# Patient Record
Sex: MALE | Race: WHITE | NOT HISPANIC OR LATINO | Employment: FULL TIME | URBAN - METROPOLITAN AREA
[De-identification: names, ages, dates, MRNs, and addresses within clinical notes are randomized per-mention and may not be internally consistent; named-entity substitution may affect disease eponyms.]

---

## 2018-01-09 ENCOUNTER — HOSPITAL ENCOUNTER (OUTPATIENT)
Dept: RADIOLOGY | Facility: OTHER | Age: 63
Discharge: HOME | End: 2018-01-09
Attending: FAMILY MEDICINE
Payer: COMMERCIAL

## 2018-01-09 ENCOUNTER — OFFICE VISIT (OUTPATIENT)
Dept: INTERNAL MEDICINE | Facility: CLINIC | Age: 63
End: 2018-01-09
Attending: FAMILY MEDICINE
Payer: COMMERCIAL

## 2018-01-09 VITALS
BODY MASS INDEX: 26.33 KG/M2 | WEIGHT: 188.06 LBS | TEMPERATURE: 98 F | DIASTOLIC BLOOD PRESSURE: 74 MMHG | OXYGEN SATURATION: 98 % | HEIGHT: 71 IN | SYSTOLIC BLOOD PRESSURE: 116 MMHG | HEART RATE: 64 BPM

## 2018-01-09 DIAGNOSIS — R05.9 COUGH: ICD-10-CM

## 2018-01-09 DIAGNOSIS — R50.9 FEVER, UNSPECIFIED FEVER CAUSE: ICD-10-CM

## 2018-01-09 DIAGNOSIS — R05.9 COUGH: Primary | ICD-10-CM

## 2018-01-09 PROCEDURE — 71046 X-RAY EXAM CHEST 2 VIEWS: CPT | Mod: 26,,, | Performed by: RADIOLOGY

## 2018-01-09 PROCEDURE — 99214 OFFICE O/P EST MOD 30 MIN: CPT | Mod: S$GLB,,, | Performed by: FAMILY MEDICINE

## 2018-01-09 PROCEDURE — 71046 X-RAY EXAM CHEST 2 VIEWS: CPT | Mod: TC,FY

## 2018-01-09 PROCEDURE — 99999 PR PBB SHADOW E&M-EST. PATIENT-LVL III: CPT | Mod: PBBFAC,,, | Performed by: FAMILY MEDICINE

## 2018-01-09 RX ORDER — ELVITEGRAVIR, COBICISTAT, EMTRICITABINE, AND TENOFOVIR ALAFENAMIDE 150; 150; 200; 10 MG/1; MG/1; MG/1; MG/1
TABLET ORAL
Refills: 1 | COMMUNITY
Start: 2017-12-20

## 2018-01-09 RX ORDER — ALBUTEROL SULFATE 90 UG/1
2 AEROSOL, METERED RESPIRATORY (INHALATION) EVERY 6 HOURS PRN
Qty: 18 G | Refills: 0 | Status: SHIPPED | OUTPATIENT
Start: 2018-01-09 | End: 2019-01-09

## 2018-01-09 RX ORDER — LOSARTAN POTASSIUM 50 MG/1
TABLET ORAL
Refills: 1 | COMMUNITY
Start: 2017-11-28

## 2018-01-09 RX ORDER — AZITHROMYCIN 250 MG/1
TABLET, FILM COATED ORAL
Qty: 6 TABLET | Refills: 0 | Status: SHIPPED | OUTPATIENT
Start: 2018-01-09 | End: 2018-01-14

## 2018-01-09 RX ORDER — BENZONATATE 100 MG/1
100 CAPSULE ORAL 3 TIMES DAILY PRN
Qty: 30 CAPSULE | Refills: 0 | Status: SHIPPED | OUTPATIENT
Start: 2018-01-09 | End: 2018-01-19

## 2018-01-09 RX ORDER — BUPROPION HYDROCHLORIDE 150 MG/1
TABLET, EXTENDED RELEASE ORAL
Refills: 5 | COMMUNITY
Start: 2017-11-26 | End: 2018-01-09

## 2018-01-09 RX ORDER — CODEINE PHOSPHATE AND GUAIFENESIN 10; 100 MG/5ML; MG/5ML
5 SOLUTION ORAL NIGHTLY PRN
Qty: 180 ML | Refills: 0 | Status: SHIPPED | OUTPATIENT
Start: 2018-01-09 | End: 2018-01-19

## 2018-01-09 NOTE — PROGRESS NOTES
"Subjective:      Patient ID: Mike Hannah is a 62 y.o. male.    Chief Complaint: Fever    He reports 5 days ago started with a sore throat, post nasal drip, dry cough. He has rested. Yesterday had chills, last night had fevers and worsening cough. He did take ibuprofen and this did help with the fever of 102. He did take mucinex dm which did help a little with the cough. He has noticed body aches. Cough is worse with laying down. He denies sinus tenderness or ear pressure. He has been taking flonase. He denies wheezing or sob. No sick contacts at home or work. He denies international travel or camping prior to onset.       Review of Systems   Constitutional: Positive for fever.   HENT: Positive for congestion.    Eyes: Negative.    Respiratory: Positive for cough. Negative for shortness of breath and wheezing.    Cardiovascular: Negative.    Gastrointestinal: Negative.    Genitourinary: Negative.    Neurological: Negative.      I personally reviewed Past Medical History, Past Surgical history,  Past Social History and Family History    Objective:   /74   Pulse 64   Temp 97.8 °F (36.6 °C) (Oral)   Ht 5' 11" (1.803 m)   Wt 85.3 kg (188 lb 0.8 oz)   SpO2 98%   BMI 26.23 kg/m²     Physical Exam   Constitutional: He is oriented to person, place, and time. He appears well-developed and well-nourished. No distress.   HENT:   Head: Normocephalic and atraumatic.   Right Ear: Hearing, tympanic membrane, external ear and ear canal normal.   Left Ear: Hearing, tympanic membrane, external ear and ear canal normal.   Mouth/Throat: Oropharynx is clear and moist. No oropharyngeal exudate.   Eyes: Conjunctivae and EOM are normal. Pupils are equal, round, and reactive to light.   Neck: Normal range of motion. Neck supple. No thyromegaly present.   Cardiovascular: Normal rate, regular rhythm, normal heart sounds and intact distal pulses.  Exam reveals no gallop and no friction rub.    No murmur heard.  Pulmonary/Chest: " Effort normal and breath sounds normal. No respiratory distress. He has no wheezes. He has no rales. He exhibits no tenderness.   Abdominal: Soft. Bowel sounds are normal. He exhibits no distension and no mass. There is no tenderness. There is no rebound and no guarding.   Musculoskeletal: Normal range of motion.   Neurological: He is alert and oriented to person, place, and time. No cranial nerve deficit.   Skin: Skin is warm and dry. He is not diaphoretic.   Psychiatric: He has a normal mood and affect. His behavior is normal. Judgment and thought content normal.   Vitals reviewed.      Mike was seen today for fever.    Diagnoses and all orders for this visit:    Cough  Fever, unspecified fever cause  -call if no improvement of symptoms   -     POCT Influenza A/B  -     X-Ray Chest PA And Lateral; Future    Other orders  -     azithromycin (Z-SAY) 250 MG tablet; Take 2 tablets by mouth on day 1; Take 1 tablet by mouth on days 2-5  -     albuterol 90 mcg/actuation inhaler; Inhale 2 puffs into the lungs every 6 (six) hours as needed for Wheezing. Rescue  -     benzonatate (TESSALON) 100 MG capsule; Take 1 capsule (100 mg total) by mouth 3 (three) times daily as needed for Cough.  -     guaifenesin-codeine 100-10 mg/5 ml (TUSSI-ORGANIDIN NR)  mg/5 mL syrup; Take 5 mLs by mouth nightly as needed.

## 2018-02-06 RX ORDER — BENZONATATE 100 MG/1
CAPSULE ORAL
Qty: 30 CAPSULE | Refills: 0 | OUTPATIENT
Start: 2018-02-06

## 2018-02-06 RX ORDER — TIZANIDINE 4 MG/1
TABLET ORAL
Qty: 180 ML | Refills: 0 | OUTPATIENT
Start: 2018-02-06

## 2018-12-06 DIAGNOSIS — M25.552 PAIN OF BOTH HIP JOINTS: Primary | ICD-10-CM

## 2018-12-06 DIAGNOSIS — M25.551 PAIN OF BOTH HIP JOINTS: Primary | ICD-10-CM

## 2018-12-10 ENCOUNTER — HOSPITAL ENCOUNTER (OUTPATIENT)
Dept: RADIOLOGY | Facility: HOSPITAL | Age: 63
Discharge: HOME | End: 2018-12-10
Attending: ORTHOPAEDIC SURGERY
Payer: COMMERCIAL

## 2018-12-10 ENCOUNTER — OFFICE VISIT (OUTPATIENT)
Dept: ORTHOPEDICS | Facility: CLINIC | Age: 63
End: 2018-12-10
Payer: COMMERCIAL

## 2018-12-10 VITALS — WEIGHT: 172.94 LBS | BODY MASS INDEX: 24.21 KG/M2 | HEIGHT: 71 IN

## 2018-12-10 DIAGNOSIS — M25.552 PAIN OF BOTH HIP JOINTS: ICD-10-CM

## 2018-12-10 DIAGNOSIS — T84.89XA ENCOUNTER FOR SURVEILLANCE OF RECALLED TOTAL HIP ARTHROPLASTY HARDWARE, INITIAL ENCOUNTER: Primary | ICD-10-CM

## 2018-12-10 DIAGNOSIS — M25.551 PAIN OF BOTH HIP JOINTS: ICD-10-CM

## 2018-12-10 DIAGNOSIS — Z96.649 ENCOUNTER FOR SURVEILLANCE OF RECALLED TOTAL HIP ARTHROPLASTY HARDWARE, INITIAL ENCOUNTER: Primary | ICD-10-CM

## 2018-12-10 PROCEDURE — 73521 X-RAY EXAM HIPS BI 2 VIEWS: CPT | Mod: 26,,, | Performed by: RADIOLOGY

## 2018-12-10 PROCEDURE — 73521 X-RAY EXAM HIPS BI 2 VIEWS: CPT | Mod: TC

## 2018-12-10 PROCEDURE — 99203 OFFICE O/P NEW LOW 30 MIN: CPT | Mod: S$GLB,,, | Performed by: ORTHOPAEDIC SURGERY

## 2018-12-10 PROCEDURE — 3008F BODY MASS INDEX DOCD: CPT | Mod: CPTII,S$GLB,, | Performed by: ORTHOPAEDIC SURGERY

## 2018-12-10 PROCEDURE — 99999 PR PBB SHADOW E&M-EST. PATIENT-LVL III: CPT | Mod: PBBFAC,,, | Performed by: ORTHOPAEDIC SURGERY

## 2018-12-10 RX ORDER — METFORMIN HYDROCHLORIDE 750 MG/1
750 TABLET, EXTENDED RELEASE ORAL
COMMUNITY

## 2018-12-10 RX ORDER — BUPROPION HYDROCHLORIDE 150 MG/1
150 TABLET ORAL DAILY
COMMUNITY

## 2018-12-10 NOTE — PROGRESS NOTES
Subjective:      Patient ID: Mike Hannah is a 63 y.o. male.    Chief Complaint: Pain of the Right Hip and Pain of the Left Hip    HPI     Mike Hannah is a 63 year old male here with a  history of right metal on metal hip replacement done by me in 2008 for AVN . The patient is a  . There was not a history of trauma.  He denies any pain today. He is in for a check up.  There is  not radiation.The patient has had prior surgery. There is not numbness or tingling of the lower extremity.  There is not back pain.  He  has not tried medications or injections.  He does not have difficulty getting in or out of a car, getting dressed, or going up or down stairs.  The patient does not use an assistive device. He denies any headaches, hallucinations, ringing in the ears or psychiatric problems.    Past Medical History:   Diagnosis Date    Herpes     History of colon polyps     HIV (human immunodeficiency virus infection)     HLD (hyperlipidemia)     HTN (hypertension)     Insomnia      Past Surgical History:   Procedure Laterality Date    APPENDECTOMY      COLONOSCOPY N/A 6/30/2016    Procedure: COLONOSCOPY;  Surgeon: Sergio Young MD;  Location: [facility] ([address] [place] [address]);  Service: Endoscopy;  Laterality: N/A;    COLONOSCOPY N/A 6/30/2016    Performed by Sergio Young MD at [facility] ([address] [place] [address])    TOTAL HIP ARTHROPLASTY Left     2008       Current Outpatient Medications:     acyclovir (ZOVIRAX) 400 MG tablet, Take by mouth once daily., Disp: , Rfl:     albuterol 90 mcg/actuation inhaler, Inhale 2 puffs into the lungs every 6 (six) hours as needed for Wheezing. Rescue, Disp: 18 g, Rfl: 0    alprazolam (XANAX) 1 MG tablet, , Disp: , Rfl:     atorvastatin (LIPITOR) 10 MG tablet, , Disp: , Rfl: 5    buPROPion (WELLBUTRIN XL) 150 MG TB24 tablet, Take 150 mg by mouth once daily., Disp: , Rfl:     FLUVIRIN [id number], PF, 45 mcg (15 mcg x 3)/0.5 mL Syrg, ADM 0.5ML IM UTD, Disp: , Rfl: 0    GENVOYA  [phone] mg Tab, TK 1 T PO QD, Disp: , Rfl: 1    losartan (COZAAR) 50 MG tablet, TK 1 T PO  ONCE A DAY, Disp: , Rfl: 1    metFORMIN (GLUCOPHAGE-XR) 750 MG 24 hr tablet, Take 750 mg by mouth daily with breakfast., Disp: , Rfl:   Review of patient's allergies indicates:  No Known Allergies  Social History     Social History Narrative    Not on file     Family History   Problem Relation Age of Onset    Hypertension Mother     Arthritis Mother     Emphysema Father            Review of Systems   Constitution: Negative for chills, fever and night sweats.   HENT: Negative for hearing loss and sore throat.    Eyes: Negative for blurred vision, photophobia and visual disturbance.   Cardiovascular: Negative for chest pain, leg swelling and palpitations.   Respiratory: Negative for cough, hemoptysis and shortness of breath.    Endocrine: Negative for polydipsia and polyuria.   Hematologic/Lymphatic: Does not bruise/bleed easily.   Skin: Negative for color change, poor wound healing and rash.   Musculoskeletal: Negative for joint pain, joint swelling and stiffness.   Gastrointestinal: Negative for abdominal pain, diarrhea, nausea and vomiting.   Genitourinary: Negative for dysuria, frequency and hematuria.   Neurological: Negative for focal weakness, headaches, loss of balance, numbness and paresthesias.   Psychiatric/Behavioral: Negative for depression, substance abuse and suicidal ideas. The patient is not nervous/anxious.          Objective:     There were no vitals filed for this visit.  Body mass index is 24.12 kg/m².          General    Constitutional: He is oriented to person, place, and time. He appears well-developed and well-nourished.   HENT:   Head: Normocephalic and atraumatic.   Eyes: Conjunctivae and EOM are normal.   Neck: Normal range of motion. Neck supple.   Cardiovascular: Intact distal pulses.    Pulmonary/Chest: Effort normal. No respiratory distress.   Abdominal: Soft.   Neurological: He is  alert and oriented to person, place, and time. He exhibits normal muscle tone. Coordination normal.   Psychiatric: He has a normal mood and affect. His behavior is normal.     General Musculoskeletal Exam   Gait: normal   Pelvic Obliquity: none        Right Hip Exam     Inspection   Scars: absent  Swelling: absent  Bruising: absent  Erythema: absent    Range of Motion   The patient has normal right hip ROM.  Extension:  -10 normal   Flexion:  120 normal   External rotation:  50 normal   Internal rotation:  15 normal     Muscle Strength   The patient has normal right hip strength.    Tests   Pain w/ forced internal rotation (MONA): absent  Trendelenburg Test: negative  Stinchfield test: negative  Log Roll: negative    Other   Sensation: normal  Left Hip Exam   Left hip exam is normal.    Inspection   Scars: present (left hip posterior incision well healed)  Swelling: absent  Erythema: absent  Bruising: absent    Range of Motion   Extension:  -10 normal   Flexion:  120 normal   External rotation:  50 normal   Internal rotation: 15 normal     Tests   Pain w/ forced internal rotation (MONA): absent  Trendelenburg Test: negative  Stinchfield test: negative  Log Roll: negative    Other   Sensation: normal      Back (L-Spine & T-Spine) / Neck (C-Spine) Exam   Back exam is normal.      Muscle Strength   Right Lower Extremity   Hip Abduction: 5/5   Hip Adduction: 5/5   Hip Flexion: 5/5   Ankle Dorsiflexion:  5/5   Left Lower Extremity   Hip Abduction: 5/5   Hip Adduction: 5/5   Hip Flexion: 5/5   Ankle Dorsiflexion:  5/5     Vascular Exam     Right Pulses  Dorsalis Pedis:      2+  Posterior Tibial:      2+        Left Pulses  Dorsalis Pedis:      2+  Posterior Tibial:      2+        Edema  Right Upper Leg: absent  Left Upper Leg: absent      Radiographs taken today were reviewed by me.  There is a prosthetic replacement of the left hip(s).  The prosthesis is well positioned.  There is not evidence of bone loss, osteolysis,  or loosening. There is not a fracture.          Assessment:       Encounter Diagnosis   Name Primary?    Encounter for surveillance of recalled total hip arthroplasty hardware, initial encounter Yes          Plan:       Mike was seen today for pain and pain.    Diagnoses and all orders for this visit:    Encounter for surveillance of recalled total hip arthroplasty hardware, initial encounter  -     Cobalt, Serum; Future  -     Chromium level; Future      Will check metal ions and call.  If OK f/u again 1-2 years.

## 2018-12-13 ENCOUNTER — TELEPHONE (OUTPATIENT)
Dept: ORTHOPEDICS | Facility: CLINIC | Age: 63
End: 2018-12-13

## 2018-12-13 NOTE — TELEPHONE ENCOUNTER
Spoke with pt to inform him Per GC that his labs  numbers have increased slightly, but are both still well below ten.  F/U in one year.and his appt will be mailed to him. Pt. verbalized understanding.           ----- Message from Twyla Kilgore MA sent at 12/13/2018  1:04 PM CST -----  Contact: Self      ----- Message -----  From: Radha Baugh  Sent: 12/13/2018  12:04 PM  To: Hannah SIFUENTES Staff    Patient Returning Call from [facility]    Who Left Message for Patient: Rosi  Communication Preference: [phone]  Additional Information: re: results

## 2018-12-13 NOTE — TELEPHONE ENCOUNTER
Left message for pt to return call @ [phone]      ----- Message from Otis Young MD sent at 12/13/2018  8:35 AM CST -----  Please call pt.  Numbers have increased slightly, but are both still well below ten.  F/U in one year.

## 2019-03-29 ENCOUNTER — TELEPHONE (OUTPATIENT)
Dept: URGENT CARE | Facility: CLINIC | Age: 64
End: 2019-03-29

## 2019-03-29 ENCOUNTER — OFFICE VISIT (OUTPATIENT)
Dept: URGENT CARE | Facility: CLINIC | Age: 64
End: 2019-03-29
Payer: COMMERCIAL

## 2019-03-29 VITALS
RESPIRATION RATE: 18 BRPM | HEIGHT: 71 IN | OXYGEN SATURATION: 99 % | SYSTOLIC BLOOD PRESSURE: 131 MMHG | DIASTOLIC BLOOD PRESSURE: 68 MMHG | TEMPERATURE: 98 F | WEIGHT: 172 LBS | BODY MASS INDEX: 24.08 KG/M2 | HEART RATE: 74 BPM

## 2019-03-29 DIAGNOSIS — L03.012 CELLULITIS OF FINGER OF LEFT HAND: Primary | ICD-10-CM

## 2019-03-29 DIAGNOSIS — M79.89 SWELLING OF LEFT HAND: ICD-10-CM

## 2019-03-29 DIAGNOSIS — M79.89 SWELLING OF LEFT MIDDLE FINGER: ICD-10-CM

## 2019-03-29 PROCEDURE — 73130 XR HAND COMPLETE 3 VIEW LEFT: ICD-10-PCS | Mod: LT,S$GLB,, | Performed by: RADIOLOGY

## 2019-03-29 PROCEDURE — 3008F BODY MASS INDEX DOCD: CPT | Mod: CPTII,S$GLB,, | Performed by: PHYSICIAN ASSISTANT

## 2019-03-29 PROCEDURE — 73130 X-RAY EXAM OF HAND: CPT | Mod: LT,S$GLB,, | Performed by: RADIOLOGY

## 2019-03-29 PROCEDURE — 99214 PR OFFICE/OUTPT VISIT, EST, LEVL IV, 30-39 MIN: ICD-10-PCS | Mod: S$GLB,,, | Performed by: PHYSICIAN ASSISTANT

## 2019-03-29 PROCEDURE — 3008F PR BODY MASS INDEX (BMI) DOCUMENTED: ICD-10-PCS | Mod: CPTII,S$GLB,, | Performed by: PHYSICIAN ASSISTANT

## 2019-03-29 PROCEDURE — 99214 OFFICE O/P EST MOD 30 MIN: CPT | Mod: S$GLB,,, | Performed by: PHYSICIAN ASSISTANT

## 2019-03-29 RX ORDER — MUPIROCIN 20 MG/G
OINTMENT TOPICAL
Qty: 22 G | Refills: 1 | Status: SHIPPED | OUTPATIENT
Start: 2019-03-29

## 2019-03-29 RX ORDER — CLINDAMYCIN HYDROCHLORIDE 300 MG/1
300 CAPSULE ORAL EVERY 6 HOURS
Qty: 40 CAPSULE | Refills: 0 | Status: SHIPPED | OUTPATIENT
Start: 2019-03-29 | End: 2019-04-08

## 2019-03-29 NOTE — TELEPHONE ENCOUNTER
Patient called back. Reviewed results of XRAY. Reminded him about plan to complete antibiotic course and RTC if it does not improve, or go to ER if things get worse.

## 2019-03-29 NOTE — PATIENT INSTRUCTIONS
· Take all antibiotic to completion  · Apply topical bactroban as needed  · Keep area clean and dry with antimicrobial soap. Avoid vigorous scrubbing  · Avoid excess activity.  · Finger splint to provide stability and protection and reduce swelling.   · NSAIDs as directed as needed for pain.    · Follow up with your PCP or specialty clinic as directed in the next 1-2 weeks if not improved or as needed.  You can call [phone] to schedule an appointment with the appropriate provider.    · You must understand that you have received an Urgent Care treatment only and that you may be released before all of your medical problems are known or treated.   · You, the patient, will arrange for follow up care as instructed.   · If your condition worsens or fails to improve we recommend that you receive another evaluation at the ER immediately or contact your PCP to discuss your concerns or return here.       Discharge Instructions for Cellulitis  You have been diagnosed with cellulitis. This is an infection in the deepest layer of the skin. In some cases, the infection also affects the muscle. Cellulitis is caused by bacteria. The bacteria can enter the body through broken skin. This can happen with a cut, scratch, animal bite, or an insect bite that has been scratched. You may have been treated in the hospital with antibiotics and fluids. You will likely be given a prescription for antibiotics to take at home. This sheet will help you take care of yourself at home.  Home care  When you are home:  · Take the prescribed antibiotic medicine you are given as directed until it is gone. Take it even if you feel better. It treats the infection and stops it from returning. Not taking all the medicine can make future infections hard to treat.  · Keep the infected area clean.  · When possible, raise the infected area above the level of your heart. This helps keep swelling down.  · Talk with your healthcare provider if you are in  pain. Ask what kind of over-the-counter medicine you can take for pain.  · Apply clean bandages as advised.  · Take your temperature once a day for a week.  · Wash your hands often to prevent spreading the infection.  In the future, wash your hands before and after you touch cuts, scratches, or bandages. This will help prevent infection.   When to call your healthcare provider  Call your healthcare provider immediately if you have any of the following:  · Difficulty or pain when moving the joints above or below the infected area  · Discharge or pus draining from the area  · Fever of 100.4°F (38°C) or higher, or as directed by your healthcare provider  · Pain that gets worse in or around the infected   · Redness that gets worse in or around the infected area, particularly if the area of redness expands to a wider area  · Shaking chills  · Swelling of the infected area  · Vomiting   Date Last Reviewed: 8/1/2016  © 7897-9881 [employer]. [address] [place] [address], [place] [address]. All rights reserved. This information is not intended as a substitute for professional medical care. Always follow your healthcare professional's instructions.

## 2019-03-29 NOTE — TELEPHONE ENCOUNTER
Called patient to let him know about hand XRAY official report showing no fracture. He does not have to continue using finger splint. Had to leave .

## 2019-03-29 NOTE — PROGRESS NOTES
"Subjective:       Patient ID: Mike Hannah is a 64 y.o. male.    Vitals:  height is 5' 11" (1.803 m) and weight is 78 kg (172 lb). His temperature is 97.8 °F (36.6 °C). His blood pressure is 131/68 and his pulse is 74. His respiration is 18 and oxygen saturation is 99%.     Chief Complaint: Hand Pain (Saturday)    Patient was working on his house over the weekend and noticed on Sunday his left middle finger was swollen and painful. He removed a splinter from it and the symptoms subsided. Was able to work and do his normal workout routine Monday and Tuesday. Then hit his finger on a door at work on Wednesday and noticed return of the pain, swelling, and redness which has worsened. He was worried about infection and has rx for Amoxicillin at home for dental infections. He took two 500mg pills twice yesterday and has been taking aleve for the pain. Went to the ER last night but left without being seen.    Hand Pain    Incident onset: Saturday. The incident occurred at home. Injury mechanism: splinter. Pain location: Left hand 3rd digit. The quality of the pain is described as aching. The pain does not radiate. The pain is at a severity of 2/10. The patient is experiencing no pain. The pain has been worsening since the incident. Pertinent negatives include no chest pain. The symptoms are aggravated by a foreign body. Treatments tried: took 2 amoxicillian. The treatment provided no relief.       Constitution: Negative for chills, fatigue and fever.   HENT: Negative for congestion and sore throat.    Neck: Negative for painful lymph nodes.   Cardiovascular: Negative for chest pain and leg swelling.   Eyes: Negative for double vision and blurred vision.   Respiratory: Negative for cough and shortness of breath.    Gastrointestinal: Negative for nausea, vomiting and diarrhea.   Genitourinary: Negative for dysuria, frequency and urgency.   Musculoskeletal: Positive for pain, joint pain and joint swelling. Negative for muscle " cramps and muscle ache.   Skin: Positive for erythema. Negative for color change, pale and rash.   Allergic/Immunologic: Negative for seasonal allergies.   Neurological: Negative for dizziness, history of vertigo, light-headedness, passing out and headaches.   Hematologic/Lymphatic: Negative for swollen lymph nodes, easy bruising/bleeding and history of blood clots. Does not bruise/bleed easily.   Psychiatric/Behavioral: Negative for nervous/anxious, sleep disturbance and depression. The patient is not nervous/anxious.        Objective:      Physical Exam   Constitutional: He is oriented to person, place, and time. He appears well-developed and well-nourished.   HENT:   Head: Normocephalic and atraumatic. Head is without abrasion, without contusion and without laceration.   Right Ear: External ear normal.   Left Ear: External ear normal.   Nose: Nose normal.   Mouth/Throat: Oropharynx is clear and moist.   Eyes: Pupils are equal, round, and reactive to light. Conjunctivae, EOM and lids are normal.   Neck: Trachea normal, full passive range of motion without pain and phonation normal. Neck supple.   Cardiovascular: Normal rate, regular rhythm and normal heart sounds.   Pulmonary/Chest: Effort normal and breath sounds normal. No stridor. No respiratory distress.   Musculoskeletal: Normal range of motion.        Hands:  Neurological: He is alert and oriented to person, place, and time.   Skin: Skin is warm, dry and intact. Capillary refill takes less than 2 seconds. No abrasion, no bruising, no burn, no ecchymosis, no laceration, no lesion and no rash noted. There is erythema.        Psychiatric: He has a normal mood and affect. His speech is normal and behavior is normal. Judgment and thought content normal. Cognition and memory are normal.   Nursing note and vitals reviewed.            XRAY left hand: I independently viewed and interpretted this image. No obvious fractures, foreign bodies, or gas buildup. Possible  fracture to lateral mid-shaft of proximal phalanx, but official XRAY report unavailable.    Assessment:       1. Cellulitis of finger of left hand    2. Swelling of left middle finger    3. Swelling of left hand        Plan:       Provided with finger splint for stabilization. Will call once official imaging report is available.  Oral and topical abx  Warm soaks, ice, and NSAIDs  Discussed ER precautions    Cellulitis of finger of left hand  -     clindamycin (CLEOCIN) 300 MG capsule; Take 1 capsule (300 mg total) by mouth every 6 (six) hours. for 10 days  Dispense: 40 capsule; Refill: 0  -     mupirocin (BACTROBAN) 2 % ointment; Apply to affected area 3 times daily  Dispense: 22 g; Refill: 1    Swelling of left middle finger  -     XR HAND COMPLETE 3 VIEW LEFT; Future; Expected date: 03/29/2019  -     SPLINT FOR HOME USE    Swelling of left hand  -     XR HAND COMPLETE 3 VIEW LEFT; Future; Expected date: 03/29/2019      Patient Instructions     · Take all antibiotic to completion  · Apply topical bactroban as needed  · Keep area clean and dry with antimicrobial soap. Avoid vigorous scrubbing  · Avoid excess activity.  · Finger splint to provide stability and protection and reduce swelling.   · NSAIDs as directed as needed for pain.    · Follow up with your PCP or specialty clinic as directed in the next 1-2 weeks if not improved or as needed.  You can call [phone] to schedule an appointment with the appropriate provider.    · You must understand that you have received an Urgent Care treatment only and that you may be released before all of your medical problems are known or treated.   · You, the patient, will arrange for follow up care as instructed.   · If your condition worsens or fails to improve we recommend that you receive another evaluation at the ER immediately or contact your PCP to discuss your concerns or return here.       Discharge Instructions for Cellulitis  You have been diagnosed with  cellulitis. This is an infection in the deepest layer of the skin. In some cases, the infection also affects the muscle. Cellulitis is caused by bacteria. The bacteria can enter the body through broken skin. This can happen with a cut, scratch, animal bite, or an insect bite that has been scratched. You may have been treated in the hospital with antibiotics and fluids. You will likely be given a prescription for antibiotics to take at home. This sheet will help you take care of yourself at home.  Home care  When you are home:  · Take the prescribed antibiotic medicine you are given as directed until it is gone. Take it even if you feel better. It treats the infection and stops it from returning. Not taking all the medicine can make future infections hard to treat.  · Keep the infected area clean.  · When possible, raise the infected area above the level of your heart. This helps keep swelling down.  · Talk with your healthcare provider if you are in pain. Ask what kind of over-the-counter medicine you can take for pain.  · Apply clean bandages as advised.  · Take your temperature once a day for a week.  · Wash your hands often to prevent spreading the infection.  In the future, wash your hands before and after you touch cuts, scratches, or bandages. This will help prevent infection.   When to call your healthcare provider  Call your healthcare provider immediately if you have any of the following:  · Difficulty or pain when moving the joints above or below the infected area  · Discharge or pus draining from the area  · Fever of 100.4°F (38°C) or higher, or as directed by your healthcare provider  · Pain that gets worse in or around the infected   · Redness that gets worse in or around the infected area, particularly if the area of redness expands to a wider area  · Shaking chills  · Swelling of the infected area  · Vomiting   Date Last Reviewed: 8/1/2016  © 4422-2948 [employer]. [address] [place] [address]  Road, [place] [address]. All rights reserved. This information is not intended as a substitute for professional medical care. Always follow your healthcare professional's instructions.

## 2019-12-15 ENCOUNTER — HOSPITAL ENCOUNTER (EMERGENCY)
Facility: HOSPITAL | Age: 64
Discharge: HOME | End: 2019-12-16
Attending: EMERGENCY MEDICINE
Payer: COMMERCIAL

## 2019-12-15 VITALS
HEART RATE: 81 BPM | SYSTOLIC BLOOD PRESSURE: 126 MMHG | HEIGHT: 71 IN | WEIGHT: 170 LBS | RESPIRATION RATE: 16 BRPM | DIASTOLIC BLOOD PRESSURE: 75 MMHG | TEMPERATURE: 98 F | OXYGEN SATURATION: 98 % | BODY MASS INDEX: 23.8 KG/M2

## 2019-12-15 DIAGNOSIS — Z01.89 PATIENT REQUEST FOR DIAGNOSTIC TESTING: Primary | ICD-10-CM

## 2019-12-15 LAB
ALBUMIN SERPL BCP-MCNC: 3.7 G/DL (ref 3.5–5.2)
ALP SERPL-CCNC: 65 U/L (ref 55–135)
ALT SERPL W/O P-5'-P-CCNC: 72 U/L (ref 10–44)
AMPHET+METHAMPHET UR QL: NORMAL
ANION GAP SERPL CALC-SCNC: 8 MMOL/L (ref 8–16)
AST SERPL-CCNC: 185 U/L (ref 10–40)
B-OH-BUTYR BLD STRIP-SCNC: 0.1 MMOL/L (ref 0–0.5)
BACTERIA #/AREA URNS AUTO: ABNORMAL /HPF
BARBITURATES UR QL SCN>200 NG/ML: NEGATIVE
BASOPHILS # BLD AUTO: 0.04 K/UL (ref 0–0.2)
BASOPHILS NFR BLD: 0.4 % (ref 0–1.9)
BENZODIAZ UR QL SCN>200 NG/ML: NORMAL
BILIRUB SERPL-MCNC: 0.8 MG/DL (ref 0.1–1)
BILIRUB UR QL STRIP: NEGATIVE
BUN SERPL-MCNC: 31 MG/DL (ref 6–30)
BUN SERPL-MCNC: 32 MG/DL (ref 6–30)
BUN SERPL-MCNC: 32 MG/DL (ref 8–23)
BZE UR QL SCN: NEGATIVE
CALCIUM SERPL-MCNC: 9.5 MG/DL (ref 8.7–10.5)
CANNABINOIDS UR QL SCN: NEGATIVE
CAOX CRY UR QL COMP ASSIST: ABNORMAL
CHLORIDE SERPL-SCNC: 100 MMOL/L (ref 95–110)
CHLORIDE SERPL-SCNC: 105 MMOL/L (ref 95–110)
CHLORIDE SERPL-SCNC: 106 MMOL/L (ref 95–110)
CLARITY UR REFRACT.AUTO: ABNORMAL
CO2 SERPL-SCNC: 24 MMOL/L (ref 23–29)
COLOR UR AUTO: YELLOW
CREAT SERPL-MCNC: 1.5 MG/DL (ref 0.5–1.4)
CREAT SERPL-MCNC: 1.7 MG/DL (ref 0.5–1.4)
CREAT SERPL-MCNC: 1.9 MG/DL (ref 0.5–1.4)
CREAT UR-MCNC: 247 MG/DL (ref 23–375)
DIFFERENTIAL METHOD: ABNORMAL
EOSINOPHIL # BLD AUTO: 0 K/UL (ref 0–0.5)
EOSINOPHIL NFR BLD: 0.3 % (ref 0–8)
ERYTHROCYTE [DISTWIDTH] IN BLOOD BY AUTOMATED COUNT: 13.6 % (ref 11.5–14.5)
EST. GFR  (AFRICAN AMERICAN): 48.5 ML/MIN/1.73 M^2
EST. GFR  (NON AFRICAN AMERICAN): 42 ML/MIN/1.73 M^2
GLUCOSE SERPL-MCNC: 206 MG/DL (ref 70–110)
GLUCOSE SERPL-MCNC: 213 MG/DL (ref 70–110)
GLUCOSE SERPL-MCNC: 228 MG/DL (ref 70–110)
GLUCOSE UR QL STRIP: ABNORMAL
HCT VFR BLD AUTO: 38.7 % (ref 40–54)
HCT VFR BLD CALC: 37 %PCV (ref 36–54)
HCT VFR BLD CALC: 41 %PCV (ref 36–54)
HGB BLD-MCNC: 12.8 G/DL (ref 14–18)
HGB UR QL STRIP: NEGATIVE
HYALINE CASTS UR QL AUTO: 14 /LPF
IMM GRANULOCYTES # BLD AUTO: 0.02 K/UL (ref 0–0.04)
IMM GRANULOCYTES NFR BLD AUTO: 0.2 % (ref 0–0.5)
KETONES UR QL STRIP: ABNORMAL
LEUKOCYTE ESTERASE UR QL STRIP: NEGATIVE
LYMPHOCYTES # BLD AUTO: 3.1 K/UL (ref 1–4.8)
LYMPHOCYTES NFR BLD: 32.8 % (ref 18–48)
MCH RBC QN AUTO: 28.5 PG (ref 27–31)
MCHC RBC AUTO-ENTMCNC: 33.1 G/DL (ref 32–36)
MCV RBC AUTO: 86 FL (ref 82–98)
METHADONE UR QL SCN>300 NG/ML: NEGATIVE
MICROSCOPIC COMMENT: ABNORMAL
MONOCYTES # BLD AUTO: 0.8 K/UL (ref 0.3–1)
MONOCYTES NFR BLD: 8.1 % (ref 4–15)
NEUTROPHILS # BLD AUTO: 5.5 K/UL (ref 1.8–7.7)
NEUTROPHILS NFR BLD: 58.2 % (ref 38–73)
NITRITE UR QL STRIP: NEGATIVE
NRBC BLD-RTO: 0 /100 WBC
OPIATES UR QL SCN: NEGATIVE
PCP UR QL SCN>25 NG/ML: NEGATIVE
PH UR STRIP: 5 [PH] (ref 5–8)
PLATELET # BLD AUTO: 201 K/UL (ref 150–350)
PMV BLD AUTO: 9.1 FL (ref 9.2–12.9)
POC IONIZED CALCIUM: 1.06 MMOL/L (ref 1.06–1.42)
POC IONIZED CALCIUM: 1.22 MMOL/L (ref 1.06–1.42)
POC TCO2 (MEASURED): 22 MMOL/L (ref 23–29)
POC TCO2 (MEASURED): 26 MMOL/L (ref 23–29)
POTASSIUM BLD-SCNC: 3.8 MMOL/L (ref 3.5–5.1)
POTASSIUM BLD-SCNC: 4.3 MMOL/L (ref 3.5–5.1)
POTASSIUM SERPL-SCNC: 4.1 MMOL/L (ref 3.5–5.1)
PROT SERPL-MCNC: 6.8 G/DL (ref 6–8.4)
PROT UR QL STRIP: ABNORMAL
RBC # BLD AUTO: 4.49 M/UL (ref 4.6–6.2)
RBC #/AREA URNS AUTO: 3 /HPF (ref 0–4)
SAMPLE: ABNORMAL
SAMPLE: ABNORMAL
SODIUM BLD-SCNC: 137 MMOL/L (ref 136–145)
SODIUM BLD-SCNC: 139 MMOL/L (ref 136–145)
SODIUM SERPL-SCNC: 137 MMOL/L (ref 136–145)
SP GR UR STRIP: 1.02 (ref 1–1.03)
TOXICOLOGY INFORMATION: NORMAL
URN SPEC COLLECT METH UR: ABNORMAL
WBC # BLD AUTO: 9.37 K/UL (ref 3.9–12.7)
WBC #/AREA URNS AUTO: 2 /HPF (ref 0–5)

## 2019-12-15 PROCEDURE — 99284 EMERGENCY DEPT VISIT MOD MDM: CPT | Mod: 25

## 2019-12-15 PROCEDURE — 99284 EMERGENCY DEPT VISIT MOD MDM: CPT | Mod: ,,, | Performed by: EMERGENCY MEDICINE

## 2019-12-15 PROCEDURE — 81001 URINALYSIS AUTO W/SCOPE: CPT | Mod: 59

## 2019-12-15 PROCEDURE — 80307 DRUG TEST PRSMV CHEM ANLYZR: CPT

## 2019-12-15 PROCEDURE — 82010 KETONE BODYS QUAN: CPT

## 2019-12-15 PROCEDURE — 80053 COMPREHEN METABOLIC PANEL: CPT

## 2019-12-15 PROCEDURE — 99284 PR EMERGENCY DEPT VISIT,LEVEL IV: ICD-10-PCS | Mod: ,,, | Performed by: EMERGENCY MEDICINE

## 2019-12-15 PROCEDURE — 96360 HYDRATION IV INFUSION INIT: CPT

## 2019-12-15 PROCEDURE — 80047 BASIC METABLC PNL IONIZED CA: CPT | Mod: 59

## 2019-12-15 PROCEDURE — 63600175 PHARM REV CODE 636 W HCPCS: Performed by: EMERGENCY MEDICINE

## 2019-12-15 PROCEDURE — 85025 COMPLETE CBC W/AUTO DIFF WBC: CPT

## 2019-12-15 RX ADMIN — SODIUM CHLORIDE 1000 ML: 0.9 INJECTION, SOLUTION INTRAVENOUS at 09:12
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
## 2021-04-28 ENCOUNTER — PATIENT MESSAGE (OUTPATIENT)
Dept: RESEARCH | Facility: HOSPITAL | Age: 66
End: 2021-04-28